# Patient Record
Sex: FEMALE | Race: WHITE
[De-identification: names, ages, dates, MRNs, and addresses within clinical notes are randomized per-mention and may not be internally consistent; named-entity substitution may affect disease eponyms.]

---

## 2018-11-05 ENCOUNTER — HOSPITAL ENCOUNTER (EMERGENCY)
Dept: HOSPITAL 74 - FER | Age: 58
Discharge: HOME | End: 2018-11-05
Payer: COMMERCIAL

## 2018-11-05 VITALS — BODY MASS INDEX: 21.6 KG/M2

## 2018-11-05 VITALS — DIASTOLIC BLOOD PRESSURE: 84 MMHG | HEART RATE: 86 BPM | SYSTOLIC BLOOD PRESSURE: 112 MMHG

## 2018-11-05 DIAGNOSIS — S61.210A: ICD-10-CM

## 2018-11-05 DIAGNOSIS — S61.212A: Primary | ICD-10-CM
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
## 2018-11-05 PROCEDURE — 3E0234Z INTRODUCTION OF SERUM, TOXOID AND VACCINE INTO MUSCLE, PERCUTANEOUS APPROACH: ICD-10-PCS

## 2018-11-05 PROCEDURE — 0HQFXZZ REPAIR RIGHT HAND SKIN, EXTERNAL APPROACH: ICD-10-PCS

## 2018-11-05 NOTE — PDOC
History of Present Illness





- General


Chief Complaint: Laceration


Stated Complaint: CUT RIGHT MIDDLE FINGER WITH KNIFE


Time Seen by Provider: 11/05/18 19:46





- History of Present Illness


Initial Comments: 





11/05/18 20:04


Patient is a 57 year old female with no significant past medical history who 

presents to the ED after sustaining a laceration to her right second and third 

fingers. She states she was grabbing a plate on her kitchen counter and 

subsequently sliced her fingers on a knife that was underneath the plate. She 

states this  knife was used to cut raw chicken. She immediately applied 

pressure to the laceration and came to the ER. She denies numbness to the tips 

of her second and third fingers. Denies weakness in her fingers. Patient does 

not recall when her last tetanus shot was. She was in her [facility] prior to 

laceration. 








Past History





- Past Medical History


Allergies/Adverse Reactions: 


 Allergies











Allergy/AdvReac Type Severity Reaction Status Date / Time


 


Penicillins Allergy   Verified 11/05/18 19:47











Home Medications: 


Ambulatory Orders





Sulfamethoxazole/Trimethoprim [Bactrim Ds Tablet] 1 each PO BID #10 tablet 11/05 /18 








COPD: No


Other medical history: DENIES





- Immunization History


Td Vaccination: No





- Suicide/Smoking/Psychosocial Hx


Smoking History: Never smoked


Have you smoked in the past 12 months: No


Information on smoking cessation initiated: No


Hx Alcohol Use: Yes (OCCAS. WINE)


Drug/Substance Use Hx: No


Substance Use Type: None





**Review of Systems





- Review of Systems


Comments:: 





11/05/18 20:07


GENERAL/CONSTITUTIONAL: No fever or chills. No weakness.


HEAD, EYES, EARS, NOSE AND THROAT: No change in vision. No ear pain or 

discharge. No sore throat.


GASTROINTESTINAL: No nausea, vomiting, diarrhea or constipation.


GENITOURINARY: No dysuria, frequency, or change in urination.


CARDIOVASCULAR: No chest pain or shortness of breath.


RESPIRATORY: No cough, wheezing, or hemoptysis.


MUSCULOSKELETAL: No joint or muscle swelling or pain. No neck or back pain.


SKIN (+) laceration to left 2nd and 3rd fingers with associated numbness. No 

rash


NEUROLOGIC: No headache, vertigo, loss of consciousness, or change in strength/

sensation.


ENDOCRINE: No increased thirst. No abnormal weight change.


HEMATOLOGIC/LYMPHATIC: No anemia, easy bleeding, or history of blood clots.


ALLERGIC/IMMUNOLOGIC: No hives or skin allergy.





*Physical Exam





- Vital Signs


 Last Vital Signs











Temp Pulse Resp BP Pulse Ox


 


    86   16   112/84   98 


 


    11/05/18 19:48  11/05/18 19:48  11/05/18 19:48  11/05/18 19:48














- Physical Exam


Comments: 





11/05/18 20:08


GENERAL: Awake, alert, and fully oriented, in no acute distress


HEAD: No signs of trauma


EYES: Sclera anicteric, conjunctiva clear


ENT: hearing grossly normal, nares patent, Moist mucosa


NECK: Normal ROM, supple


LUNGS: Breath sounds equal, clear to auscultation bilaterally.  No wheezes, and 

no crackles


HEART: Regular rate and rhythm, normal S1 and S2, no murmurs, rubs or gallops


ABDOMEN: Soft, nontender, normoactive bowel sounds


EXTREMITIES: R 2nd finger with 2mm superficial linear laceration, hemostatic. R 

3rd finger with 1cm linear laceration to distal fingertip with mild oozing of 

blood. No foreign body visualized. Normal sensation to distal fingertips and 

entire finger. Normal strength at all finger joints. 2+radial pulses. 


NEUROLOGICAL:  Normal speech, cranial nerves intact, normal gait


SKIN: large old appearing scar to medial L forearm, c/d/i











Procedures





- Laceration/Wound Repair


  ** Right Volar Finger 3rd digit


Wound Length: to 2.5 cm


Wound Explored: clean


Wound's Depth, Shape: superficial, linear


Irrigated w/ Saline: Yes


Betadine Prep: No


Anesthesia: 1% Lidocaine


Amount of Anesthetic (ccs): 2


Wound Repaired With: Sutures


Suture Size/Type: 5:0


Number of Sutures: 4


Layer Closure: No


Sterile Dressing Applied: Yes





ED Treatment Course





- Medications


Given in the ED: 


ED Medications














Discontinued Medications














Generic Name Dose Route Start Last Admin





  Trade Name Freq  PRN Reason Stop Dose Admin


 


Diphtheria/Tetanus/Acell Pertussis  0.5 ml  11/05/18 19:50  11/05/18 19:54





  Boostrix -  IM  11/05/18 19:51  0.5 ml





  .ONCE ONE   Administration





     





     





     





     














Medical Decision Making





- Medical Decision Making





11/05/18 20:12


56yo F presents with lac to R 2nd and 3rd distal fingertips. Lac to R 2nd digit 

superfical, will irrigate and reassess but unlikely to need suturing. Lac to R 

3rd digit will irrigate and suture repair. Tdap ordered. 





11/05/18 20:48


Digital block performed but poor anesthesia, placed another 1cc into area 

around lac with good relief. 4 simple interrupted sutures placed in R 3rd 

digit. Sterile dressing placed. Knife was being used to cut raw chicken, will 

give bactrim ppx. PT to return in 10 days for suture removal and sooner if any 

redness, discharge, pain, fevers.





I discussed the physical exam findings, ancillary test results and final 

diagnoses with the patient. I answered all of the patient's questions. The 

patient was satisfied with the care received and felt comfortable with the 

discharge plan and treatment plan. The patient will call their primary care 

physician within 24 hours to arrange follow-up and will return to the Emergency 

Department with any new, persistent or worsening symptoms. 








*DC/Admit/Observation/Transfer


Diagnosis at time of Disposition: 


 Laceration








- Discharge Dispostion


Disposition: HOME


Condition at time of disposition: Stable


Decision to Admit order: No





- Prescriptions


Prescriptions: 


Sulfamethoxazole/Trimethoprim [Bactrim Ds Tablet] 1 each PO BID #10 tablet





- Referrals


Referrals: 


Celina Suarez [Primary Care Provider] - 





- Patient Instructions


Printed Discharge Instructions:  DI for Laceration Repair


Additional Instructions: 


Keep the incision clean and dry for 24 hours.


After 24 hours, you may allow the soap and water to rinse off your incision.


Avoid direct pressure of the water to the incision.


Pat the incision dry with a clean cloth.


Apply a small amount of bacitracin onto the incision.


Cover the incision loosely with a bandaid.


Take tylenol as needed for pain.


Follow up with your physician in 48 hours for a wound check.


Return to the emergency department or to urgent care in 10 days for stitch 

removal. 


Return to the emergency department if you notice fevers, red streaks, increase 

redness, swelling, discharge or severe pain to the incision.





- Post Discharge Activity





- Attestations


Physician Attestion: 





11/05/18 20:52








I, Dr. Rohit Perez MD, attest that this document has been prepared under my 

direction and personally reviewed by me in its entirety.   I further attest, 

that it accurately reflects all work, treatment, procedures and medical decision

-making performed by me.